# Patient Record
(demographics unavailable — no encounter records)

---

## 2024-11-04 NOTE — HEALTH RISK ASSESSMENT
[Patient reported mammogram was normal] : Patient reported mammogram was normal [Patient reported PAP Smear was normal] : Patient reported PAP Smear was normal [Patient reported bone density results were abnormal] : Patient reported bone density results were abnormal [Patient reported colonoscopy was normal] : Patient reported colonoscopy was normal [Yes] : Yes [2 - 3 times a week (3 pts)] : 2 - 3  times a week (3 points) [1 or 2 (0 pts)] : 1 or 2 (0 points) [Never (0 pts)] : Never (0 points) [0] : 2) Feeling down, depressed, or hopeless: Not at all (0) [PHQ-2 Negative - No further assessment needed] : PHQ-2 Negative - No further assessment needed [[id number]] : 0 [Never] : Never [MammogramDate] : 11/23 [MammogramComments] : BIRADS 2 [PapSmearDate] : 02/24 [BoneDensityDate] : 11/23 [BoneDensityComments] : osteopenia [ColonoscopyDate] : 05/23 [ColonoscopyComments] : Diverticulosis in sigmoid colon. No specimens collected. Repeat colonoscopy in 5-10 years for surveillance. [HepatitisCDate] : 07/19

## 2024-11-04 NOTE — ASSESSMENT
[FreeTextEntry1] : 64 y/o F with HTN, high cholesterol here for CPE.  HTN - BP controlled - Continue losartan 50 daily - Check electrolytes  High cholesterol - significant dietary changes. Prior 10-year ascvd risk=intermediate - Continue atorvastatin 40mg daily - Recheck cholesterol today  Hypothyroidism  - Check tsh - Continue synthroid 100mcg po daily - take on empty stomach  HCM: - Received COVID booster and flu booster - Fall 2024 - Will get Shingrix at pharmacy - Td >10 years but had sterile abscess - Mammogram 11/23--> due for repeat now - DEXA 11/23 - osteopenia; optimize Ca/Vit D. Repeat next year - Pap 2/24 - negative - Colonoscopy 5/2023 - negative. Repeat in 5-10 years. - Derm 2/2024 - Ophtho - need to see; referred to ophtho - Dental UTD - Prevnar 20 today  RTC in 1 year or sooner prn.

## 2024-11-04 NOTE — HISTORY OF PRESENT ILLNESS
[de-identified] : High cholesterol - on atorvastatin 40. 10-year ascvd risk was intermediate in May 2024 Has really changed her diet since last year. Eats vegetarian 3x/week. Really avoids red meat. Eats 2 meals/day. Skips breakfast. At lunch has been having whole grain bread with hummus. Lost 4 lbs since last visit. Also doing yoga and aerobics.  No chest pain. No SOB. No dizziness. No palpitations.  HTN - on losartan Checks BPs at home occasionally 110s-120s. DBP 80s.   GERD - takes pepcid at nighttime. Had been prescribed by GI.   Normal BMs. Urinating well.   Saw GYN Feb 2024  Sleeps well.   Did get flu and covid vaccine.  Never smoker EtOH - 1 glass a few times a week No drugs. Retired. Has 2 granddaughters (4 y/o and 6 weeks old). Mood good.